# Patient Record
Sex: FEMALE | Race: WHITE | Employment: OTHER
[De-identification: names, ages, dates, MRNs, and addresses within clinical notes are randomized per-mention and may not be internally consistent; named-entity substitution may affect disease eponyms.]

---

## 2017-06-19 DIAGNOSIS — E51.9 THIAMINE DEFICIENCY: ICD-10-CM

## 2017-06-20 RX ORDER — THIAMINE HCL 50 MG
TABLET ORAL
Qty: 100 TABLET | Refills: 0 | Status: SHIPPED | OUTPATIENT
Start: 2017-06-20 | End: 2020-08-04

## 2017-06-20 NOTE — TELEPHONE ENCOUNTER
Thiamine HCl (VITAMIN  B-1) 50 MG tablet      Last Written Prescription Date: 07/11/2016  Last Fill Quantity: 90,  # refills: prn   Last Office Visit with [facility], [facility] or [facility] prescribing provider: 10/24/2016

## 2017-10-19 DIAGNOSIS — G25.81 RESTLESS LEGS SYNDROME (RLS): ICD-10-CM

## 2017-10-19 RX ORDER — PRAMIPEXOLE DIHYDROCHLORIDE 0.12 MG/1
0.12 TABLET ORAL AT BEDTIME
Qty: 30 TABLET | Refills: 0 | Status: SHIPPED | OUTPATIENT
Start: 2017-10-19 | End: 2020-08-04

## 2017-10-19 NOTE — TELEPHONE ENCOUNTER
Medication is being filled for 1 time refill only due to:  Patient needs to be seen because it has been more than one year since last visit.    
pramipexole     Last Written Prescription Date: 07/11/16  Last Fill Quantity: 90, # refills: 3  Last Office Visit with [facility], [facility] or [facility] prescribing provider: 10/24/16        BP Readings from Last 3 Encounters:   10/24/16 120/78   09/13/16 109/70   08/19/16 112/75       
Soft, non-tender, no hepatosplenomegaly, normal bowel sounds

## 2020-03-02 ENCOUNTER — HEALTH MAINTENANCE LETTER (OUTPATIENT)
Age: 80
End: 2020-03-02

## 2020-08-04 ENCOUNTER — VIRTUAL VISIT (OUTPATIENT)
Dept: GERIATRICS | Facility: CLINIC | Age: 80
End: 2020-08-04
Payer: MEDICARE

## 2020-08-04 VITALS
SYSTOLIC BLOOD PRESSURE: 92 MMHG | DIASTOLIC BLOOD PRESSURE: 60 MMHG | HEART RATE: 83 BPM | BODY MASS INDEX: 22.5 KG/M2 | OXYGEN SATURATION: 95 % | WEIGHT: 122.3 LBS | HEIGHT: 62 IN | TEMPERATURE: 98.5 F | RESPIRATION RATE: 18 BRPM

## 2020-08-04 DIAGNOSIS — F33.9 RECURRENT MAJOR DEPRESSIVE DISORDER, REMISSION STATUS UNSPECIFIED (H): ICD-10-CM

## 2020-08-04 DIAGNOSIS — F01.518 VASCULAR DEMENTIA WITH BEHAVIOR DISTURBANCE (H): ICD-10-CM

## 2020-08-04 DIAGNOSIS — J44.9 CHRONIC OBSTRUCTIVE PULMONARY DISEASE, UNSPECIFIED COPD TYPE (H): ICD-10-CM

## 2020-08-04 DIAGNOSIS — U07.1 COVID-19 VIRUS DETECTED: Primary | ICD-10-CM

## 2020-08-04 DIAGNOSIS — N18.9 CHRONIC KIDNEY DISEASE, UNSPECIFIED CKD STAGE: ICD-10-CM

## 2020-08-04 DIAGNOSIS — I48.91 ATRIAL FIBRILLATION, UNSPECIFIED TYPE (H): ICD-10-CM

## 2020-08-04 PROBLEM — F05 DELIRIUM DUE TO MULTIPLE ETIOLOGIES: Status: ACTIVE | Noted: 2020-04-28

## 2020-08-04 PROCEDURE — 99309 SBSQ NF CARE MODERATE MDM 30: CPT | Mod: 95 | Performed by: NURSE PRACTITIONER

## 2020-08-04 RX ORDER — GABAPENTIN 100 MG/1
100 CAPSULE ORAL AT BEDTIME
COMMUNITY
Start: 2020-04-24 | End: 2021-07-30

## 2020-08-04 RX ORDER — HALOPERIDOL 2 MG/ML
1 SOLUTION ORAL
COMMUNITY
Start: 2020-07-30 | End: 2020-08-13

## 2020-08-04 RX ORDER — AMOXICILLIN 250 MG
1 CAPSULE ORAL 2 TIMES DAILY PRN
COMMUNITY
Start: 2020-07-30 | End: 2020-08-28

## 2020-08-04 RX ORDER — DIVALPROEX SODIUM 125 MG/1
125 CAPSULE, COATED PELLETS ORAL EVERY EVENING
Start: 2020-08-04 | End: 2020-08-28

## 2020-08-04 RX ORDER — DIVALPROEX SODIUM 125 MG/1
125 CAPSULE, COATED PELLETS ORAL EVERY EVENING
COMMUNITY
Start: 2020-04-24 | End: 2020-08-13

## 2020-08-04 RX ORDER — OLANZAPINE 2.5 MG/1
2.5 TABLET, FILM COATED ORAL 2 TIMES DAILY
COMMUNITY
Start: 2020-04-24 | End: 2020-08-28

## 2020-08-04 RX ORDER — LORAZEPAM 2 MG/ML
0.5 CONCENTRATE ORAL
COMMUNITY
Start: 2020-05-20 | End: 2020-08-13

## 2020-08-04 RX ORDER — MORPHINE SULFATE 20 MG/ML
0.25 SOLUTION ORAL 3 TIMES DAILY
COMMUNITY
Start: 2020-07-30

## 2020-08-04 ASSESSMENT — MIFFLIN-ST. JEOR: SCORE: 978

## 2020-08-04 NOTE — PROGRESS NOTES
" [facility]  Audrey Jeffery is being evaluated via a billable video visit due to the restrictions of the Covid-19 pandemic and facility request that providers do not come into the building at this time.   The patient has been notified of following:  \"This video visit will be conducted via a call between you and your provider. We have found that certain health care needs can be provided without the need for an in-person physical exam.  This service lets us provide the care you need with a video conversation. If during the course of the call the provider feels a video visit is not appropriate, you will not be charged for this service.\"   The provider has received verbal consent for a Video Visit from the patient and or first contact? Yes  Patient/facility staff would like the video invitation sent by: N/A   Video Start Time: 10:45am  Which Facility the Patient is at during the time of visit: [facility]    PRIMARY CARE PROVIDER AND CLINIC:  [facility], [address] [place] [address] Medical Records / [facility] [id number]  Chief Complaint   Patient presents with     [facility] Medical Record Number:  [id number]  Audrey Jeffery  is a 80 year old  (1940), admitted to the above facility from group home..  Admitted to this facility for  medical management, nursing care and hospice.    HPI:    HPI information obtained from: facility chart records, facility staff and Care Everywhere Epic chart review.     Updates on Status Since Skilled nursing Admission:   Patient was recently hospitalized due to COVID 19. She was discharged back to her group home, but then sent here for care needs. She is enrolled in hospice. Patient is sleeping soundly, so did not awaken. Nurse in room reports no issues with behaviors. No uncontrolled pain. She has been eating a drinking pretty well    CODE STATUS/ADVANCE DIRECTIVES DISCUSSION:   DNR / DNI    ALLERGIES: Sinubid [acetohist]; " Phenylpropanolamine; Betadine [povidone iodine]; Ciprofloxacin; and Levofloxacin  PAST MEDICAL HISTORY:  has a past medical history of Congestive heart failure, unspecified and COPD (chronic obstructive pulmonary disease) (H). She also has no past medical history of Basal cell carcinoma, Malignant melanoma (H), or Squamous cell carcinoma.  PAST SURGICAL HISTORY:   has a past surgical history that includes XR Shoulder Surgery ASHIA Left (2004); Cholecystectomy; and back surgery (1996).  FAMILY HISTORY: family history is not on file.  SOCIAL HISTORY:   reports that she quit smoking about 3 years ago. Her smoking use included cigarettes. She has a 27.50 pack-year smoking history. She has never used smokeless tobacco. She reports that she does not drink alcohol or use drugs.  Current Outpatient Medications   Medication Sig Dispense Refill     BETA BLOCKER NOT PRESCRIBED, INTENTIONAL, Beta Blocker not prescribed intentionally due to Hypotension (SBP < 90)  0     divalproex sodium delayed-release (DEPAKOTE SPRINKLE) 125 MG DR capsule Take 125 mg by mouth every evening       divalproex sodium delayed-release (DEPAKOTE SPRINKLES) 125 MG DR capsule Take 125 mg by mouth every evening       gabapentin (NEURONTIN) 100 MG capsule Take 100 mg by mouth At Bedtime       haloperidol (HALDOL) 2 MG/ML (HIGH CONC) solution Take 1 mg by mouth every 2 hours as needed       LORazepam (ATIVAN) 2 MG/ML (HIGH CONC) solution Take 0.5 mg by mouth every 2 hours as needed       morphine sulfate HIGH CONCENTRATE (ROXANOL) 20 mg/mL (HIGH CONC) solution Take 0.25 mLs by mouth 3 times daily Also 0.25mL po q1h prn       OLANZapine (ZYPREXA) 2.5 MG tablet Take 2.5 mg by mouth 2 times daily Also 2.5mg po TID PRN       ORDER FOR DME Positional Restriction device (Zzoma pillow)  Use during sleep to avoid sleep on back. 1 Device 0     senna-docusate (SENOKOT-S/PERICOLACE) 8.6-50 MG tablet Take 1 tablet by mouth 2 times daily as needed        ROS:  "Unobtainable secondary to somnolence  Vitals:BP 92/60   Pulse 83   Temp 98.5  F (36.9  C)   Resp 18   Ht 1.575 m (5' 2\")   Wt 55.5 kg (122 lb 4.8 oz)   SpO2 95%   BMI 22.37 kg/m     Limited Visit Exam done given COVID-19 precautions:  GENERAL APPEARANCE:  somnolent  RESP:  no respiratory distress    Lab/Diagnostic data:  none    ASSESSMENT/PLAN:  (U07.1) COVID-19 virus detected  (primary encounter diagnosis)  (F01.51) Vascular dementia with behavior disturbance (H)  (J44.9) Chronic obstructive pulmonary disease, unspecified COPD type (H)  (N18.9) Chronic kidney disease, unspecified CKD stage  (I48.91) Atrial fibrillation, unspecified type (H)  (F33.9) Recurrent major depressive disorder, remission status unspecified (H)  Comment: Goal is comfort care. No acute concerns today. Hospice is following closely.   Plan: Continue current POC with no changes at this time and adjustments as needed.      Electronically signed by:  SEBLE Edmond CNP   [facility]  Phone: [phone]      Video-Visit Details  Type of service:  Video Visit  Video End Time (time video stopped): 10:46am  Distant Location (provider location):  [place] [facility]             "

## 2020-08-04 NOTE — LETTER
"    8/4/2020        RE: Audrey Jeffery  [address]   [address]  [facility] [address]         [place] [facility]  Audrey Jeffery is being evaluated via a billable video visit due to the restrictions of the Covid-19 pandemic and facility request that providers do not come into the building at this time.   The patient has been notified of following:  \"This video visit will be conducted via a call between you and your provider. We have found that certain health care needs can be provided without the need for an in-person physical exam.  This service lets us provide the care you need with a video conversation. If during the course of the call the provider feels a video visit is not appropriate, you will not be charged for this service.\"   The provider has received verbal consent for a Video Visit from the patient and or first contact? Yes  Patient/facility staff would like the video invitation sent by: N/A   Video Start Time: 10:45am  Which Facility the Patient is at during the time of visit: [facility]    PRIMARY CARE PROVIDER AND CLINIC:  [address] Medical Records / [facility] [address]  Chief Complaint   Patient presents with     [facility] Medical Record Number:  [id number]  Audrey Jeffery  is a 80 year old  (1940), admitted to the above facility from group home..  Admitted to this facility for  medical management, nursing care and hospice.    HPI:    HPI information obtained from: facility chart records, facility staff and Care Everywhere Epic chart review.     Updates on Status Since Skilled nursing Admission:   Patient was recently hospitalized due to COVID 19. She was discharged back to her group home, but then sent here for care needs. She is enrolled in hospice. Patient is sleeping soundly, so did not awaken. Nurse in room reports no issues with behaviors. No uncontrolled pain. She has been eating a drinking pretty " well    CODE STATUS/ADVANCE DIRECTIVES DISCUSSION:   DNR / DNI    ALLERGIES: Sinubid [acetohist]; Phenylpropanolamine; Betadine [povidone iodine]; Ciprofloxacin; and Levofloxacin  PAST MEDICAL HISTORY:  has a past medical history of Congestive heart failure, unspecified and COPD (chronic obstructive pulmonary disease) (H). She also has no past medical history of Basal cell carcinoma, Malignant melanoma (H), or Squamous cell carcinoma.  PAST SURGICAL HISTORY:   has a past surgical history that includes XR Shoulder Surgery ASHIA Left (2004); Cholecystectomy; and back surgery (1996).  FAMILY HISTORY: family history is not on file.  SOCIAL HISTORY:   reports that she quit smoking about 3 years ago. Her smoking use included cigarettes. She has a 27.50 pack-year smoking history. She has never used smokeless tobacco. She reports that she does not drink alcohol or use drugs.  Current Outpatient Medications   Medication Sig Dispense Refill     BETA BLOCKER NOT PRESCRIBED, INTENTIONAL, Beta Blocker not prescribed intentionally due to Hypotension (SBP < 90)  0     divalproex sodium delayed-release (DEPAKOTE SPRINKLE) 125 MG DR capsule Take 125 mg by mouth every evening       divalproex sodium delayed-release (DEPAKOTE SPRINKLES) 125 MG DR capsule Take 125 mg by mouth every evening       gabapentin (NEURONTIN) 100 MG capsule Take 100 mg by mouth At Bedtime       haloperidol (HALDOL) 2 MG/ML (HIGH CONC) solution Take 1 mg by mouth every 2 hours as needed       LORazepam (ATIVAN) 2 MG/ML (HIGH CONC) solution Take 0.5 mg by mouth every 2 hours as needed       morphine sulfate HIGH CONCENTRATE (ROXANOL) 20 mg/mL (HIGH CONC) solution Take 0.25 mLs by mouth 3 times daily Also 0.25mL po q1h prn       OLANZapine (ZYPREXA) 2.5 MG tablet Take 2.5 mg by mouth 2 times daily Also 2.5mg po TID PRN       ORDER FOR DME Positional Restriction device (Zzoma pillow)  Use during sleep to avoid sleep on back. 1 Device 0     senna-docusate  "(SENOKOT-S/PERICOLACE) 8.6-50 MG tablet Take 1 tablet by mouth 2 times daily as needed        ROS: Unobtainable secondary to somnolence  Vitals:BP 92/60   Pulse 83   Temp 98.5  F (36.9  C)   Resp 18   Ht 1.575 m (5' 2\")   Wt 55.5 kg (122 lb 4.8 oz)   SpO2 95%   BMI 22.37 kg/m     Limited Visit Exam done given COVID-19 precautions:  GENERAL APPEARANCE:  somnolent  RESP:  no respiratory distress    Lab/Diagnostic data:  none    ASSESSMENT/PLAN:  (U07.1) COVID-19 virus detected  (primary encounter diagnosis)  (F01.51) Vascular dementia with behavior disturbance (H)  (J44.9) Chronic obstructive pulmonary disease, unspecified COPD type (H)  (N18.9) Chronic kidney disease, unspecified CKD stage  (I48.91) Atrial fibrillation, unspecified type (H)  (F33.9) Recurrent major depressive disorder, remission status unspecified (H)  Comment: Goal is comfort care. No acute concerns today. Hospice is following closely.   Plan: Continue current POC with no changes at this time and adjustments as needed.      Electronically signed by:  SEBLE Edmond CNP   [facility]  Phone: [phone]      Video-Visit Details  Type of service:  Video Visit  Video End Time (time video stopped): 10:46am  Distant Location (provider location):  [place]                     "

## 2020-08-09 ASSESSMENT — MIFFLIN-ST. JEOR: SCORE: 987.52

## 2020-08-13 ENCOUNTER — VIRTUAL VISIT (OUTPATIENT)
Dept: GERIATRICS | Facility: CLINIC | Age: 80
End: 2020-08-13
Payer: MEDICARE

## 2020-08-13 VITALS
HEIGHT: 62 IN | OXYGEN SATURATION: 94 % | SYSTOLIC BLOOD PRESSURE: 158 MMHG | WEIGHT: 124.4 LBS | RESPIRATION RATE: 18 BRPM | DIASTOLIC BLOOD PRESSURE: 80 MMHG | HEART RATE: 80 BPM | TEMPERATURE: 98.5 F | BODY MASS INDEX: 22.89 KG/M2

## 2020-08-13 DIAGNOSIS — I48.91 ATRIAL FIBRILLATION, UNSPECIFIED TYPE (H): ICD-10-CM

## 2020-08-13 DIAGNOSIS — J44.9 CHRONIC OBSTRUCTIVE PULMONARY DISEASE, UNSPECIFIED COPD TYPE (H): ICD-10-CM

## 2020-08-13 DIAGNOSIS — F01.518 VASCULAR DEMENTIA WITH BEHAVIOR DISTURBANCE (H): ICD-10-CM

## 2020-08-13 DIAGNOSIS — U07.1 COVID-19 VIRUS INFECTION: Primary | ICD-10-CM

## 2020-08-13 PROCEDURE — 99309 SBSQ NF CARE MODERATE MDM 30: CPT | Mod: 95 | Performed by: NURSE PRACTITIONER

## 2020-08-13 RX ORDER — HALOPERIDOL 2 MG/ML
SOLUTION ORAL
Start: 2020-08-13 | End: 2020-08-28

## 2020-08-13 NOTE — PROGRESS NOTES
"[facility]   Audrey Jeffery is being evaluated via a billable video visit due to the restrictions of the Covid-19 pandemic and facility request that providers do not come into the building at this time.   The patient has been notified of following:  \"This video visit will be conducted via a call between you and your provider. We have found that certain health care needs can be provided without the need for an in-person physical exam.  This service lets us provide the care you need with a video conversation. If during the course of the call the provider feels a video visit is not appropriate, you will not be charged for this service.\"   The provider has received verbal consent for a Video Visit from the patient or first contact? Yes  Patient  or facility staff would like the video invitation sent by: N/A   Video Start Time: 1:49pm    [place] Medical Record Number:  [id number]  Place of Location at the time of visit: [facility]  Chief Complaint   Patient presents with     RECHECK     HPI:  Audrey Jeffery  is a 80 year old (1940), who is being seen today for a visit. Patient was admitted from her group home due to increased care needs and COVID-19 infection. She is enrolled in hospice and will be staying LTC.     HPI information obtained from: facility chart records, facility staff and patient report.     Today's concern is:  Patient answers some questions, but for the most part, she seems to think about it and then never answer. She denies any cough or SOB, but is reporting some chest pain currently. She says she would like some pain medication. According to staff, she continues to get up for meals and eats a fair amount.       Past Medical and Surgical History reviewed in Epic today.  MEDICATIONS:    Current Outpatient Medications   Medication Sig Dispense Refill     divalproex sodium delayed-release (DEPAKOTE SPRINKLES) 125 MG DR capsule Take 125 mg by mouth every evening       " "gabapentin (NEURONTIN) 100 MG capsule Take 100 mg by mouth At Bedtime       haloperidol (HALDOL) 2 MG/ML (HIGH CONC) solution Take 0.5 mLs (1 mg) by mouth daily. May also take 0.5 mLs (1 mg) every 2 hours as needed for agitation. Scheduled dose at 4pm       morphine sulfate HIGH CONCENTRATE (ROXANOL) 20 mg/mL (HIGH CONC) solution Take 0.25 mLs by mouth 3 times daily Also 0.25mL po q1h prn       OLANZapine (ZYPREXA) 2.5 MG tablet Take 2.5 mg by mouth 2 times daily Also 2.5mg po TID PRN       senna-docusate (SENOKOT-S/PERICOLACE) 8.6-50 MG tablet Take 1 tablet by mouth 2 times daily as needed       BETA BLOCKER NOT PRESCRIBED, INTENTIONAL, Beta Blocker not prescribed intentionally due to Hypotension (SBP < 90)  0     ORDER FOR DME Positional Restriction device (Zzoma pillow)  Use during sleep to avoid sleep on back. 1 Device 0     REVIEW OF SYSTEMS: Limited secondary to cognitive impairment but today pt reports 4 point ROS including Respiratory, CV, GI and , other than that noted in the HPI,  is negative    Objective: BP (!) 158/80   Pulse 80   Temp 98.5  F (36.9  C)   Resp 18   Ht 1.575 m (5' 2\")   Wt 56.4 kg (124 lb 6.4 oz)   SpO2 94%   BMI 22.75 kg/m    Limited visit exam done given COVID-19 precautions.   GENERAL APPEARANCE:  Alert, in no distress, thin  EYES:  Conjunctiva and lids normal  RESP:  no respiratory distress, no cough  PSYCH:  insight and judgement impaired, memory impaired , affect abnormal flat     Labs:   none    ASSESSMENT/PLAN:  (U07.1) COVID-19 virus infection  (primary encounter diagnosis)  Comment: Asymptomatic. Patient will be moving off the covid unit soon and upstairs to a LTC bed    (F01.51) Vascular dementia with behavior disturbance (H)  Comment: Patient is on depakote and zyprexa due to recent history of severe emotional distress, delusions, paranoia. These issues appear to be under good control at this time. Due to comfort care goals, would not recommend decreasing any of these " medications as this would likely cause emotional harm  Plan: Continue current POC with no changes at this time and adjustments as needed.    (J44.9) Chronic obstructive pulmonary disease, unspecified COPD type (H)  Comment: Oxygen dependent. Chest pain could be pleural, musculoskeletal, or cardiac. Either way, recommend treating with morphine due to comfort care goals  Plan: Continue current POC with no changes at this time and adjustments as needed.    (I48.91) Atrial fibrillation, unspecified type (H)  Comment: No anticoagulation due to comfort care, fall risk      Electronically signed by:  SEBLE Edmond CNP   [facility]  Phone: [phone]      Video-Visit Details  Type of service:  Video Visit  Video End Time (time video stopped): 1:51pm  Distant Location (provider location):  [facility]

## 2020-08-13 NOTE — LETTER
"    8/13/2020        RE: Audrey Jeffery  [address]   [address]  [facility] [address]        [place] [facility]   Audrey Jeffery is being evaluated via a billable video visit due to the restrictions of the Covid-19 pandemic and facility request that providers do not come into the building at this time.   The patient has been notified of following:  \"This video visit will be conducted via a call between you and your provider. We have found that certain health care needs can be provided without the need for an in-person physical exam.  This service lets us provide the care you need with a video conversation. If during the course of the call the provider feels a video visit is not appropriate, you will not be charged for this service.\"   The provider has received verbal consent for a Video Visit from the patient or first contact? Yes  Patient  or facility staff would like the video invitation sent by: N/A   Video Start Time: 1:49pm    [facility] Medical Record Number:  [id number]  Place of Location at the time of visit: [facility]  Chief Complaint   Patient presents with     RECHECK     HPI:  Audrey Jeffery  is a 80 year old (1940), who is being seen today for a visit. Patient was admitted from her group home due to increased care needs and COVID-19 infection. She is enrolled in hospice and will be staying LTC.     HPI information obtained from: facility chart records, facility staff and patient report.     Today's concern is:  Patient answers some questions, but for the most part, she seems to think about it and then never answer. She denies any cough or SOB, but is reporting some chest pain currently. She says she would like some pain medication. According to staff, she continues to get up for meals and eats a fair amount.       Past Medical and Surgical History reviewed in Epic today.  MEDICATIONS:    Current Outpatient Medications   Medication Sig Dispense Refill     divalproex " "sodium delayed-release (DEPAKOTE SPRINKLES) 125 MG DR capsule Take 125 mg by mouth every evening       gabapentin (NEURONTIN) 100 MG capsule Take 100 mg by mouth At Bedtime       haloperidol (HALDOL) 2 MG/ML (HIGH CONC) solution Take 0.5 mLs (1 mg) by mouth daily. May also take 0.5 mLs (1 mg) every 2 hours as needed for agitation. Scheduled dose at 4pm       morphine sulfate HIGH CONCENTRATE (ROXANOL) 20 mg/mL (HIGH CONC) solution Take 0.25 mLs by mouth 3 times daily Also 0.25mL po q1h prn       OLANZapine (ZYPREXA) 2.5 MG tablet Take 2.5 mg by mouth 2 times daily Also 2.5mg po TID PRN       senna-docusate (SENOKOT-S/PERICOLACE) 8.6-50 MG tablet Take 1 tablet by mouth 2 times daily as needed       BETA BLOCKER NOT PRESCRIBED, INTENTIONAL, Beta Blocker not prescribed intentionally due to Hypotension (SBP < 90)  0     ORDER FOR DME Positional Restriction device (Zzoma pillow)  Use during sleep to avoid sleep on back. 1 Device 0     REVIEW OF SYSTEMS: Limited secondary to cognitive impairment but today pt reports 4 point ROS including Respiratory, CV, GI and , other than that noted in the HPI,  is negative    Objective: BP (!) 158/80   Pulse 80   Temp 98.5  F (36.9  C)   Resp 18   Ht 1.575 m (5' 2\")   Wt 56.4 kg (124 lb 6.4 oz)   SpO2 94%   BMI 22.75 kg/m    Limited visit exam done given COVID-19 precautions.   GENERAL APPEARANCE:  Alert, in no distress, thin  EYES:  Conjunctiva and lids normal  RESP:  no respiratory distress, no cough  PSYCH:  insight and judgement impaired, memory impaired , affect abnormal flat     Labs:   none    ASSESSMENT/PLAN:  (U07.1) COVID-19 virus infection  (primary encounter diagnosis)  Comment: Asymptomatic. Patient will be moving off the covid unit soon and upstairs to a LTC bed    (F01.51) Vascular dementia with behavior disturbance (H)  Comment: Patient is on depakote and zyprexa due to recent history of severe emotional distress, delusions, paranoia. These issues appear to be " under good control at this time. Due to comfort care goals, would not recommend decreasing any of these medications as this would likely cause emotional harm  Plan: Continue current POC with no changes at this time and adjustments as needed.    (J44.9) Chronic obstructive pulmonary disease, unspecified COPD type (H)  Comment: Oxygen dependent. Chest pain could be pleural, musculoskeletal, or cardiac. Either way, recommend treating with morphine due to comfort care goals  Plan: Continue current POC with no changes at this time and adjustments as needed.    (I48.91) Atrial fibrillation, unspecified type (H)  Comment: No anticoagulation due to comfort care, fall risk      Electronically signed by:  SEBLE Edmond CNP   [facility]  Phone: [phone]      Video-Visit Details  Type of service:  Video Visit  Video End Time (time video stopped): 1:51pm  Distant Location (provider location):  [facility]

## 2020-08-19 ENCOUNTER — NURSING HOME VISIT (OUTPATIENT)
Dept: GERIATRICS | Facility: CLINIC | Age: 80
End: 2020-08-19
Payer: COMMERCIAL

## 2020-08-19 DIAGNOSIS — J44.9 CHRONIC OBSTRUCTIVE PULMONARY DISEASE, UNSPECIFIED COPD TYPE (H): ICD-10-CM

## 2020-08-19 DIAGNOSIS — F01.518 VASCULAR DEMENTIA WITH BEHAVIOR DISTURBANCE (H): ICD-10-CM

## 2020-08-19 DIAGNOSIS — U07.1 COVID-19 VIRUS INFECTION: Primary | ICD-10-CM

## 2020-08-20 NOTE — PROGRESS NOTES
"Patient was seen for initial long-term care visit at the [facility] on August 19, 2020    The patient and/or legal representative have been notified of following:  \"This video visit will be conducted via a call between you and your provider. We have found that certain health care needs can be provided without the need for an in-person physical exam. This service lets us provide the care you need with a video conversation. If during the course of the call the provider feels a video visit is not appropriate, you will not be charged for this service.\"   The provider has received verbal consent for a Video Visit from the patient or first contact? Yes  Video Time: [id number]      Provider's Distant Location: [facility].    Patient is a 80-year-old female with a history of dementia who was admitted from her group home secondary to increased care needs and COVID-19 infection (asymptomatic)    Goals of care are comfort oriented.  Patient has been enrolled in [facility] hospice.    Past medical history is remarkable for COPD, vascular dementia, chronic atrial fibrillation.  There have been significant behavioral concerns related to her underlying dementia.  She is currently receiving Depakote and Zyprexa with good control of behaviors currently    Patient is unable to articulate any specific physical concerns  Vital signs have been stable  She is lying in bed on her back, appears comfortable  She is oriented to person.  Speech is nonsensical  Respiratory rate 12, unlabored.  Patient is currently not wearing her oxygen  No tremor or rigidity    Assessment    Dementia, advanced, with significant mental functional status decline.  Asymptomatic COVID 19 status  COPD, oxygen dependent    Plan:  Continue comfort cares with hospice  Offer O2 for comfort.        "

## 2020-08-28 ENCOUNTER — VIRTUAL VISIT (OUTPATIENT)
Dept: GERIATRICS | Facility: CLINIC | Age: 80
End: 2020-08-28
Payer: COMMERCIAL

## 2020-08-28 VITALS
WEIGHT: 124 LBS | TEMPERATURE: 98 F | SYSTOLIC BLOOD PRESSURE: 105 MMHG | RESPIRATION RATE: 20 BRPM | HEIGHT: 62 IN | DIASTOLIC BLOOD PRESSURE: 56 MMHG | BODY MASS INDEX: 22.82 KG/M2 | HEART RATE: 62 BPM | OXYGEN SATURATION: 90 %

## 2020-08-28 DIAGNOSIS — J44.9 CHRONIC OBSTRUCTIVE PULMONARY DISEASE, UNSPECIFIED COPD TYPE (H): ICD-10-CM

## 2020-08-28 DIAGNOSIS — F01.518 VASCULAR DEMENTIA WITH BEHAVIOR DISTURBANCE (H): Primary | ICD-10-CM

## 2020-08-28 DIAGNOSIS — F05 DELIRIUM DUE TO MULTIPLE ETIOLOGIES: ICD-10-CM

## 2020-08-28 DIAGNOSIS — K59.01 SLOW TRANSIT CONSTIPATION: ICD-10-CM

## 2020-08-28 PROCEDURE — 99309 SBSQ NF CARE MODERATE MDM 30: CPT | Mod: GT | Performed by: NURSE PRACTITIONER

## 2020-08-28 RX ORDER — OLANZAPINE 2.5 MG/1
2.5 TABLET, FILM COATED ORAL 2 TIMES DAILY
Start: 2020-08-28

## 2020-08-28 RX ORDER — AMOXICILLIN 250 MG
2 CAPSULE ORAL 2 TIMES DAILY
Start: 2020-08-28

## 2020-08-28 RX ORDER — DIVALPROEX SODIUM 125 MG/1
125 CAPSULE, COATED PELLETS ORAL 2 TIMES DAILY
Start: 2020-08-28

## 2020-08-28 RX ORDER — HALOPERIDOL 2 MG/ML
1 SOLUTION ORAL 3 TIMES DAILY
Start: 2020-08-28

## 2020-08-28 RX ORDER — LORAZEPAM 2 MG/ML
0.5 CONCENTRATE ORAL
Start: 2020-08-28

## 2020-08-28 ASSESSMENT — MIFFLIN-ST. JEOR: SCORE: 985.71

## 2020-08-28 NOTE — LETTER
"    8/28/2020        RE: Audrey Jeffery  [address]   [address]  [facility] [address]        [facility]   Audrey Jeffery is being evaluated via a billable video visit due to the restrictions of the Covid-19 pandemic and facility request that providers do not come into the building at this time.   The patient has been notified of following:  \"This video visit will be conducted via a call between you and your provider. We have found that certain health care needs can be provided without the need for an in-person physical exam.  This service lets us provide the care you need with a video conversation. If during the course of the call the provider feels a video visit is not appropriate, you will not be charged for this service.\"   The provider has received verbal consent for a Video Visit from the patient or first contact? Yes  Patient  or facility staff would like the video invitation sent by: N/A   Video Start Time: 11:04am    [place] Medical Record Number:  [id number]  Place of Location at the time of visit: [facility]  Chief Complaint   Patient presents with     RECHECK     HPI:  Audrey Jeffery  is a 80 year old (1940), who is being seen today for a visit.  HPI information obtained from: facility chart records and facility staff.     Today's concern is:  Vascular dementia with behavior disturbance (H)  Delirium due to multiple etiologies  Chronic obstructive pulmonary disease, unspecified COPD type (H)    Patient is enrolled in hospice due to COPD, end-stage dementia, severe mental illness, FTT. She alternates between being very restless and sleeping. She had a fall last night with a large skin tear to her forearm. She is not able to participate in HPI, just mumbles. Prn lorazepam has been effective. They use it sporadically. Nursing is also reporting that they gave a suppository yesterday due to positive rectal check.      Past Medical and Surgical History reviewed in " "Epic today.  MEDICATIONS:    Current Outpatient Medications   Medication Sig Dispense Refill     divalproex sodium delayed-release (DEPAKOTE SPRINKLES) 125 MG DR capsule Take 125 mg by mouth every evening       gabapentin (NEURONTIN) 100 MG capsule Take 100 mg by mouth At Bedtime       haloperidol (HALDOL) 2 MG/ML (HIGH CONC) solution Take 0.5 mLs (1 mg) by mouth 3 times daily Scheduled dose at 4pm       LORazepam (ATIVAN) 2 MG/ML (HIGH CONC) solution Take 0.25 mLs (0.5 mg) by mouth every 2 hours as needed for agitation or anxiety       morphine sulfate HIGH CONCENTRATE (ROXANOL) 20 mg/mL (HIGH CONC) solution Take 0.25 mLs by mouth 3 times daily Also 0.25mL po q1h prn       OLANZapine (ZYPREXA) 2.5 MG tablet Take 1 tablet (2.5 mg) by mouth 2 times daily       senna-docusate (SENOKOT-S/PERICOLACE) 8.6-50 MG tablet Take 1 tablet by mouth 2 times daily as needed       BETA BLOCKER NOT PRESCRIBED, INTENTIONAL, Beta Blocker not prescribed intentionally due to Hypotension (SBP < 90)  0     REVIEW OF SYSTEMS: Unobtainable secondary to cognitive impairment.     Objective: /56   Pulse 62   Temp 98  F (36.7  C)   Resp 20   Ht 1.575 m (5' 2\")   Wt 56.2 kg (124 lb)   SpO2 90%   BMI 22.68 kg/m    Limited visit exam done given COVID-19 precautions.   GENERAL APPEARANCE:  Laying askew in bed, sits up whenever a question is asked, but does not answer and lays back down. Restless, appears anxious  RESP:  no respiratory distress  SKIN:  large skin tear right forearm, skin was replaced, hematoma underneath  PSYCH:  no verbal response, restless, anxious     Labs:   none    ASSESSMENT/PLAN:  (F01.51) Vascular dementia with behavior disturbance (H)  (primary encounter diagnosis)  (F05) Delirium due to multiple etiologies  Comment: Goal is comfort care. Due to this and her apparent anxiety, will try increasing depakote as this may be less sedating than other medications. It will be very challenging to eliminate her distress " and her fall risk without completely sedating her. She does not appear to be in pain. Continuation of PRN order for non-antipsychotic psychotropic medication, lorazepam, is appropriate due to sporadic anxiety and is being reordered today with an end date of 60 days.  Nsg to update [facility] provider of frequency of use 7 days prior to end date.   Plan: Increase depakote to BID. Continue all other medications. F/u hospice. Monitor mood and behavior.    (J44.9) Chronic obstructive pulmonary disease, unspecified COPD type (H)  Comment: Chronic condition being managed with medications and is currently asymptomatic.  Plan: Continue current POC with no changes at this time and adjustments as needed.    (K59.01) Slow transit constipation  Comment: Due to medications.   Plan: Increase senna-s to 2 tabs BID. Monitor bowel function. Adjust medication as clinically indicated.      Electronically signed by:  SEBLE Edmond CNP   [facility]  Phone: [phone]    Video-Visit Details  Type of service:  Video Visit  Video End Time (time video stopped): 11:06am  Distant Location (provider location):  [place] [facility]

## 2020-08-28 NOTE — PROGRESS NOTES
"[facility]   Audrey Jeffery is being evaluated via a billable video visit due to the restrictions of the Covid-19 pandemic and facility request that providers do not come into the building at this time.   The patient has been notified of following:  \"This video visit will be conducted via a call between you and your provider. We have found that certain health care needs can be provided without the need for an in-person physical exam.  This service lets us provide the care you need with a video conversation. If during the course of the call the provider feels a video visit is not appropriate, you will not be charged for this service.\"   The provider has received verbal consent for a Video Visit from the patient or first contact? Yes  Patient  or facility staff would like the video invitation sent by: N/A   Video Start Time: 11:04am    [facility] Medical Record Number:  [id number]  Place of Location at the time of visit: [facility]  Chief Complaint   Patient presents with     RECHECK     HPI:  Audrey Jeffery  is a 80 year old (1940), who is being seen today for a visit.  HPI information obtained from: facility chart records and facility staff.     Today's concern is:  Vascular dementia with behavior disturbance (H)  Delirium due to multiple etiologies  Chronic obstructive pulmonary disease, unspecified COPD type (H)    Patient is enrolled in hospice due to COPD, end-stage dementia, severe mental illness, FTT. She alternates between being very restless and sleeping. She had a fall last night with a large skin tear to her forearm. She is not able to participate in HPI, just mumbles. Prn lorazepam has been effective. They use it sporadically. Nursing is also reporting that they gave a suppository yesterday due to positive rectal check.      Past Medical and Surgical History reviewed in Epic today.  MEDICATIONS:    Current Outpatient Medications   Medication Sig Dispense Refill     " "divalproex sodium delayed-release (DEPAKOTE SPRINKLES) 125 MG DR capsule Take 125 mg by mouth every evening       gabapentin (NEURONTIN) 100 MG capsule Take 100 mg by mouth At Bedtime       haloperidol (HALDOL) 2 MG/ML (HIGH CONC) solution Take 0.5 mLs (1 mg) by mouth 3 times daily Scheduled dose at 4pm       LORazepam (ATIVAN) 2 MG/ML (HIGH CONC) solution Take 0.25 mLs (0.5 mg) by mouth every 2 hours as needed for agitation or anxiety       morphine sulfate HIGH CONCENTRATE (ROXANOL) 20 mg/mL (HIGH CONC) solution Take 0.25 mLs by mouth 3 times daily Also 0.25mL po q1h prn       OLANZapine (ZYPREXA) 2.5 MG tablet Take 1 tablet (2.5 mg) by mouth 2 times daily       senna-docusate (SENOKOT-S/PERICOLACE) 8.6-50 MG tablet Take 1 tablet by mouth 2 times daily as needed       BETA BLOCKER NOT PRESCRIBED, INTENTIONAL, Beta Blocker not prescribed intentionally due to Hypotension (SBP < 90)  0     REVIEW OF SYSTEMS: Unobtainable secondary to cognitive impairment.     Objective: /56   Pulse 62   Temp 98  F (36.7  C)   Resp 20   Ht 1.575 m (5' 2\")   Wt 56.2 kg (124 lb)   SpO2 90%   BMI 22.68 kg/m    Limited visit exam done given COVID-19 precautions.   GENERAL APPEARANCE:  Laying askew in bed, sits up whenever a question is asked, but does not answer and lays back down. Restless, appears anxious  RESP:  no respiratory distress  SKIN:  large skin tear right forearm, skin was replaced, hematoma underneath  PSYCH:  no verbal response, restless, anxious     Labs:   none    ASSESSMENT/PLAN:  (F01.51) Vascular dementia with behavior disturbance (H)  (primary encounter diagnosis)  (F05) Delirium due to multiple etiologies  Comment: Goal is comfort care. Due to this and her apparent anxiety, will try increasing depakote as this may be less sedating than other medications. It will be very challenging to eliminate her distress and her fall risk without completely sedating her. She does not appear to be in pain. Continuation " of PRN order for non-antipsychotic psychotropic medication, lorazepam, is appropriate due to sporadic anxiety and is being reordered today with an end date of 60 days.  Nsg to update [facility] provider of frequency of use 7 days prior to end date.   Plan: Increase depakote to BID. Continue all other medications. F/u hospice. Monitor mood and behavior.    (J44.9) Chronic obstructive pulmonary disease, unspecified COPD type (H)  Comment: Chronic condition being managed with medications and is currently asymptomatic.  Plan: Continue current POC with no changes at this time and adjustments as needed.    (K59.01) Slow transit constipation  Comment: Due to medications.   Plan: Increase senna-s to 2 tabs BID. Monitor bowel function. Adjust medication as clinically indicated.      Electronically signed by:  SEBLE Edmond CNP   [facility]  Phone: [phone]    Video-Visit Details  Type of service:  Video Visit  Video End Time (time video stopped): 11:06am  Distant Location (provider location):  [place] [facility]